# Patient Record
Sex: FEMALE | Race: ASIAN
[De-identification: names, ages, dates, MRNs, and addresses within clinical notes are randomized per-mention and may not be internally consistent; named-entity substitution may affect disease eponyms.]

---

## 2019-09-03 ENCOUNTER — HOSPITAL ENCOUNTER (EMERGENCY)
Dept: HOSPITAL 25 - ED | Age: 49
Discharge: HOME | End: 2019-09-03
Payer: COMMERCIAL

## 2019-09-03 VITALS — DIASTOLIC BLOOD PRESSURE: 75 MMHG | SYSTOLIC BLOOD PRESSURE: 110 MMHG

## 2019-09-03 DIAGNOSIS — R06.02: ICD-10-CM

## 2019-09-03 DIAGNOSIS — Z79.899: ICD-10-CM

## 2019-09-03 DIAGNOSIS — K08.89: ICD-10-CM

## 2019-09-03 DIAGNOSIS — R07.89: Primary | ICD-10-CM

## 2019-09-03 LAB
ALBUMIN SERPL BCG-MCNC: 4.5 G/DL (ref 3.2–5.2)
ALBUMIN/GLOB SERPL: 1.6 {RATIO} (ref 1–3)
ALP SERPL-CCNC: 63 U/L (ref 34–104)
ALT SERPL W P-5'-P-CCNC: 15 U/L (ref 7–52)
ANION GAP SERPL CALC-SCNC: 5 MMOL/L (ref 2–11)
AST SERPL-CCNC: 20 U/L (ref 13–39)
BASOPHILS # BLD AUTO: 0.1 10^3/UL (ref 0–0.2)
BUN SERPL-MCNC: 15 MG/DL (ref 6–24)
BUN/CREAT SERPL: 16.9 (ref 8–20)
CALCIUM SERPL-MCNC: 9.6 MG/DL (ref 8.6–10.3)
CHLORIDE SERPL-SCNC: 103 MMOL/L (ref 101–111)
EOSINOPHIL # BLD AUTO: 0.1 10^3/UL (ref 0–0.6)
GLOBULIN SER CALC-MCNC: 2.8 G/DL (ref 2–4)
GLUCOSE SERPL-MCNC: 123 MG/DL (ref 70–100)
HCO3 SERPL-SCNC: 29 MMOL/L (ref 22–32)
HCT VFR BLD AUTO: 38 % (ref 35–47)
HGB BLD-MCNC: 13.2 G/DL (ref 12–16)
INR PPP/BLD: 0.97 (ref 0.82–1.09)
LYMPHOCYTES # BLD AUTO: 1.6 10^3/UL (ref 1–4.8)
MCH RBC QN AUTO: 31 PG (ref 27–31)
MCHC RBC AUTO-ENTMCNC: 34 G/DL (ref 31–36)
MCV RBC AUTO: 89 FL (ref 80–97)
MONOCYTES # BLD AUTO: 0.4 10^3/UL (ref 0–0.8)
NEUTROPHILS # BLD AUTO: 2 10^3/UL (ref 1.5–7.7)
NRBC # BLD AUTO: 0 10^3/UL
NRBC BLD QL AUTO: 0
PLATELET # BLD AUTO: 277 10^3/UL (ref 150–450)
POTASSIUM SERPL-SCNC: 3.7 MMOL/L (ref 3.5–5)
PROT SERPL-MCNC: 7.3 G/DL (ref 6.4–8.9)
RBC # BLD AUTO: 4.3 10^6 /UL (ref 3.7–4.87)
SODIUM SERPL-SCNC: 137 MMOL/L (ref 135–145)
TROPONIN I SERPL-MCNC: 0 NG/ML (ref ?–0.04)
WBC # BLD AUTO: 4.2 10^3/UL (ref 3.5–10.8)

## 2019-09-03 PROCEDURE — 85610 PROTHROMBIN TIME: CPT

## 2019-09-03 PROCEDURE — 99283 EMERGENCY DEPT VISIT LOW MDM: CPT

## 2019-09-03 PROCEDURE — 85379 FIBRIN DEGRADATION QUANT: CPT

## 2019-09-03 PROCEDURE — 93005 ELECTROCARDIOGRAM TRACING: CPT

## 2019-09-03 PROCEDURE — 84484 ASSAY OF TROPONIN QUANT: CPT

## 2019-09-03 PROCEDURE — 80053 COMPREHEN METABOLIC PANEL: CPT

## 2019-09-03 PROCEDURE — 85025 COMPLETE CBC W/AUTO DIFF WBC: CPT

## 2019-09-03 PROCEDURE — 71046 X-RAY EXAM CHEST 2 VIEWS: CPT

## 2019-09-03 PROCEDURE — 36415 COLL VENOUS BLD VENIPUNCTURE: CPT

## 2019-09-03 NOTE — ED
HPI Chest Pain





- HPI Summary


HPI Summary: 


This patient is a 49 year old F presenting to ED with a chief complaint of 

chest tightness with inspiration since two weeks ago worsening in the past two 

days. Patient states she has been waking up with the chest tightness and pain 

upon breathing. Patient denies cough. No LE edema. She states she recently had 

an infected tooth since two months ago for which she is seeing a dentist but 

denies fevers. Patient is not on birth control. Patient recently traveled to 

[place] two weeks ago. She did not feel SOB before traveling. The patient rates 

the pain 4/10 in severity. Symptoms aggravated by breathing. Symptoms 

alleviated by rest. No hx blood clots or cardiac problems. Does not smoke, not 

on oral contraceptives. 





- History of Current Complaint


Chief Complaint: EDChestPainROMI


Time Seen by Provider: 09/03/19 21:35


Hx Obtained From: Patient


Onset/Duration: Started Weeks Ago - 2 weeks, Worse Since - past few days


Timing: Intermittent - When waking up


Initial Severity: Moderate


Current Severity: Moderate


Pain Intensity: 4


Pain Scale Used: 0-10 Numeric


Character: Tightness


Aggravating Factor(s): Nothing


Alleviating Factor(s): Nothing


Associated Signs and Symptoms: Positive: Chest Pain, Shortness of Breath.  

Negative: Fever, Cough, Edema





- Allergy/Home Medications


Allergies/Adverse Reactions: 


 Allergies











Allergy/AdvReac Type Severity Reaction Status Date / Time


 


oxycodone Allergy  Unknown Verified 09/03/19 22:05





   Reaction  





   Details  











Home Medications: 


 Home Medications





LoraTADine TAB(NF) [Claritin 10 MG TAB(NF)] 10 mg PO DAILY 09/03/19 [History 

Confirmed 09/03/19]











PMH/Surg Hx/FS Hx/Imm Hx


Endocrine/Hematology History: 


   Denies: Hx Diabetes


Cardiovascular History: 


   Denies: Hx Hypertension, Hx Pacemaker/ICD


GI History: Reports: Hx Irritable Bowel


Musculoskeletal History: Reports: Hx Arthritis - NECK


   Denies: Hx Rheumatoid Arthritis, Hx Osteoporosis, Hx Scoliosis


Sensory History: 


   Denies: Hx Contacts or Glasses, Hx Hearing Aid


Opthamlomology History: 


   Denies: Hx Contacts or Glasses


Neurological History: 


   Denies: Hx Headaches, Other Neuro Impairments/Disorders


Psychiatric History: 


   Denies: Hx Panic Disorder





- Cancer History


Hx Chemotherapy: No


Hx Radiation Therapy: No





- Surgical History


Surgery Procedure, Year, and Place: LEFT shoulder surgery 5/13/15;


Infectious Disease History: No


Infectious Disease History: 


   Denies: History Other Infectious Disease, Traveled Outside the [place] in Last 30 

Days





- Family History


Known Family History: Positive: Hypertension





- Social History


Alcohol Use: Occasionally


Hx Substance Use: No


Substance Use Type: Reports: None


Hx Tobacco Use: No


Smoking Status (MU): Never Smoked Tobacco





Review of Systems


Negative: Fever


Positive: Dental Pain


Positive: Chest Pain - Tightness worse on breathing


Positive: Shortness Of Breath.  Negative: Cough


Negative: Edema


All Other Systems Reviewed And Are Negative: Yes





Physical Exam





- Summary


Physical Exam Summary: 


Constitutional: Well-developed, Well-nourished, Alert. (-) Distressed


Skin: Warm, Dry


HENT: Normocephalic; Atraumatic. Dental caries, no periapical abscess. 


Eyes: Conjunctiva normal


Neck: Musculoskeletal ROM normal neck. (-) JVD, (-) Stridor, (-) Nuchal rigidity


Cardio: Rhythm regular, bradycardia; Intact distal pulses; Radial pulses are 2+ 

and symmetric  (-) Murmur


Pulmonary/Chest wall: Effort normal. (-) Respiratory distress, (-) Wheezes, (-) 

Rales


Abd: Soft, (-) tenderness, (-) Distension, (-) Guarding, (-) Rebound


Musculoskeletal: (-) Edema


Lymph: (-) Cervical adenopathy


Neuro: Alert, Oriented x3


Psych: Mood and affect Normal


Triage Information Reviewed: Yes


Vital Signs On Initial Exam: 


 Initial Vitals











Temp Pulse Resp BP Pulse Ox


 


 98.2 F   50   20   117/70   100 


 


 09/03/19 18:43  09/03/19 18:43  09/03/19 18:43  09/03/19 18:43  09/03/19 18:43











Vital Signs Reviewed: Yes





Diagnostics





- Vital Signs


 Vital Signs











  Temp Pulse Resp BP Pulse Ox


 


 09/03/19 20:45  99.1 F  48  18  116/79  100


 


 09/03/19 18:43  98.2 F  50  20  117/70  100














- Laboratory


Lab Results: 


 Lab Results











  09/03/19 09/03/19 09/03/19 Range/Units





  19:14 19:14 19:14 


 


WBC  4.2    (3.5-10.8)  10^3/uL


 


RBC  4.30    (3.70-4.87)  10^6 /uL


 


Hgb  13.2    (12.0-16.0)  g/dL


 


Hct  38    (35-47)  %


 


MCV  89    (80-97)  fL


 


MCH  31    (27-31)  pg


 


MCHC  34    (31-36)  g/dL


 


RDW  14    (10-15)  %


 


Plt Count  277    (150-450)  10^3/uL


 


MPV  7.2 L    (7.4-10.4)  fL


 


Neut % (Auto)  47.3    %


 


Lymph % (Auto)  39.0    %


 


Mono % (Auto)  9.5    %


 


Eos % (Auto)  2.8    %


 


Baso % (Auto)  1.4    %


 


Absolute Neuts (auto)  2.0    (1.5-7.7)  10^3/ul


 


Absolute Lymphs (auto)  1.6    (1.0-4.8)  10^3/ul


 


Absolute Monos (auto)  0.4    (0-0.8)  10^3/ul


 


Absolute Eos (auto)  0.1    (0-0.6)  10^3/ul


 


Absolute Basos (auto)  0.1    (0-0.2)  10^3/ul


 


Absolute Nucleated RBC  0.0    10^3/ul


 


Nucleated RBC %  0.0    


 


INR (Anticoag Therapy)   0.97   (0.82-1.09)  


 


D-Dimer, Quantitative   Pending   


 


Sodium    137  (135-145)  mmol/L


 


Potassium    3.7  (3.5-5.0)  mmol/L


 


Chloride    103  (101-111)  mmol/L


 


Carbon Dioxide    29  (22-32)  mmol/L


 


Anion Gap    5  (2-11)  mmol/L


 


BUN    15  (6-24)  mg/dL


 


Creatinine    0.89  (0.51-0.95)  mg/dL


 


Est GFR ( Amer)    80.9  (>60)  


 


Est GFR (Non-Af Amer)    66.9  (>60)  


 


BUN/Creatinine Ratio    16.9  (8-20)  


 


Glucose    123 H  ()  mg/dL


 


Calcium    9.6  (8.6-10.3)  mg/dL


 


Total Bilirubin    0.30  (0.2-1.0)  mg/dL


 


AST    20  (13-39)  U/L


 


ALT    15  (7-52)  U/L


 


Alkaline Phosphatase    63  ()  U/L


 


Troponin I    0.00  (<0.04)  ng/mL


 


Total Protein    7.3  (6.4-8.9)  g/dL


 


Albumin    4.5  (3.2-5.2)  g/dL


 


Globulin    2.8  (2-4)  g/dL


 


Albumin/Globulin Ratio    1.6  (1-3)  











Result Diagrams: 


 09/03/19 19:14





 09/03/19 19:14


Lab Statement: Any lab studies that have been ordered have been reviewed, and 

results considered in the medical decision making process.





- Radiology


  ** CXR


Radiology Interpretation Completed By: ED Physician


Summary of Radiographic Findings: No acute processes, pending official 

radiology report.





- EKG


  ** 1839


Cardiac Rate: Bradycardia - 54 BPM


EKG Rhythm: Sinus Bradycardia


Summary of EKG Findings: Sinus bradycardia at 54 BPM, T-wave inversions in V1 

and V2





Re-Evaluation





- Re-Evaluation


  ** First Eval


Comment: Discussed with patient negative d-dimer, normal chest x-ray.  Unclear 

cause for pleuritic chest pain, however no emergent need for further workup





Chest Pain Course/Dx





- Course


Course Of Treatment: 49-year-old female with no past medical history presents 

with pleuritic chest pain.  Chest Pain DDX:  The patient is well appearing, 

with stable vitals.  Differential includes.  --Pneumothorax: Equal breath sounds

, story inconsistent since gradual onset of symptoms. CXR shows no evidence of 

pneumothorax. Unlikely.  --Aortic dissection: The patient does not describe the 

classical tearing chest pain radiating into the back, and the CXR does not show 

mediastinal widening or other signs of aortic dissection. Unlikely.  --PE: 

Vitals wnl (not hypoxic, tachycardic or tachypneic). Wells low risk, d dimer <

200.  --ACS: The initial EKG shows no ischemic changes. The initial troponin is 

not elevated.





- Diagnoses


Provider Diagnoses: 


 Pleuritic chest pain








Discharge ED





- Sign-Out/Discharge


Documenting (check all that apply): Patient Departure - Discharge


Patient Received Moderate/Deep Sedation with Procedure: No





- Discharge Plan


Condition: Stable


Disposition: HOME


Patient Education Materials:  Pleurisy (DC)


Referrals: 


Ilsa Tavarez MD [Primary Care Provider] - 


Additional Instructions: 


You were seen in the emergency department for pleuritic chest pain.  Your labs 

not show any evidence of blood clots or heart problems.  EKG did not show a 

heart attack.  Your chest x-ray did not show any evidence of pneumonia or 

collapsed lung.


If any studies were not completed at the time of discharge you will be called 

with the relevant results.


Please follow up with your primary care doctor in next 2-3 days and return to 

emergency department for worsening or concerning symptoms.





- Billing Disposition and Condition


Condition: STABLE


Disposition: Home





- Attestation Statements


Document Initiated by Nereidaibe: Yes


Documenting Scribe: Saran Cunningham


Provider For Whom Scribe is Documenting (Include Credential): Ryanne Roger MD


Scribe Attestation: 


ISaran, scribed for Ryanne Roger MD on 09/03/19 at 2300. 


Scribe Documentation Reviewed: Yes


Provider Attestation: 


The documentation as recorded by the Saran duff accurately reflects 

the service I personally performed and the decisions made by me, Ryanne Roger MD


Status of Scribe Document: Viewed